# Patient Record
Sex: FEMALE | Race: BLACK OR AFRICAN AMERICAN | NOT HISPANIC OR LATINO | Employment: OTHER | URBAN - METROPOLITAN AREA
[De-identification: names, ages, dates, MRNs, and addresses within clinical notes are randomized per-mention and may not be internally consistent; named-entity substitution may affect disease eponyms.]

---

## 2018-06-01 ENCOUNTER — ANESTHESIA EVENT (OUTPATIENT)
Dept: SURGERY | Facility: OTHER | Age: 58
End: 2018-06-01
Payer: MEDICARE

## 2018-06-01 ENCOUNTER — HOSPITAL ENCOUNTER (OUTPATIENT)
Dept: PREADMISSION TESTING | Facility: OTHER | Age: 58
Discharge: HOME | End: 2018-06-01
Attending: ORTHOPAEDIC SURGERY
Payer: MEDICARE

## 2018-06-01 VITALS
BODY MASS INDEX: 31.86 KG/M2 | HEIGHT: 67 IN | OXYGEN SATURATION: 99 % | TEMPERATURE: 99 F | DIASTOLIC BLOOD PRESSURE: 55 MMHG | HEART RATE: 73 BPM | SYSTOLIC BLOOD PRESSURE: 112 MMHG | WEIGHT: 203 LBS

## 2018-06-01 RX ORDER — TRAZODONE HYDROCHLORIDE 100 MG/1
100 TABLET ORAL NIGHTLY
COMMUNITY

## 2018-06-01 RX ORDER — ASPIRIN 81 MG/1
81 TABLET ORAL 2 TIMES DAILY
COMMUNITY

## 2018-06-01 RX ORDER — RISPERIDONE 1 MG/1
1 TABLET ORAL DAILY
COMMUNITY

## 2018-06-01 RX ORDER — SODIUM CHLORIDE, SODIUM LACTATE, POTASSIUM CHLORIDE, CALCIUM CHLORIDE 600; 310; 30; 20 MG/100ML; MG/100ML; MG/100ML; MG/100ML
INJECTION, SOLUTION INTRAVENOUS CONTINUOUS
Status: CANCELLED | OUTPATIENT
Start: 2018-06-01

## 2018-06-01 RX ORDER — LOSARTAN POTASSIUM 100 MG/1
100 TABLET ORAL DAILY
COMMUNITY

## 2018-06-01 RX ORDER — METHOCARBAMOL 500 MG/1
500 TABLET, FILM COATED ORAL 4 TIMES DAILY
COMMUNITY

## 2018-06-01 RX ORDER — OXYCODONE AND ACETAMINOPHEN 5; 325 MG/1; MG/1
1 TABLET ORAL EVERY 4 HOURS PRN
Status: ON HOLD | COMMUNITY
End: 2018-06-04 | Stop reason: HOSPADM

## 2018-06-01 RX ORDER — PREGABALIN 75 MG/1
150 CAPSULE ORAL
Status: DISCONTINUED | OUTPATIENT
Start: 2018-06-01 | End: 2018-06-02 | Stop reason: HOSPADM

## 2018-06-01 RX ORDER — FAMOTIDINE 20 MG/1
20 TABLET, FILM COATED ORAL
Status: CANCELLED | OUTPATIENT
Start: 2018-06-01 | End: 2018-06-01

## 2018-06-01 RX ORDER — CLONIDINE HYDROCHLORIDE 0.1 MG/1
0.1 TABLET ORAL 2 TIMES DAILY
COMMUNITY

## 2018-06-01 NOTE — DISCHARGE INSTRUCTIONS
PRE-ADMIT TESTING -  [phone]    [address]  [facility]          Your surgery has been scheduled at [facility]. We are pleased to have the opportunity to serve you. For Further Information please call [phone].    On the day of surgery please report to the Information Desk on the 1st floor.    · CONTACT YOUR PHYSICIAN'S OFFICE THE DAY PRIOR TO YOUR SURGERY TO OBTAIN YOUR ARRIVAL TIME.     · The evening before surgery do not eat anything after 9 p.m. ( this includes hard candy, chewing gum and mints).  You may only have GATORADE, POWERADE AND WATER  from 9 p.m. until you leave your home.   DO NOT DRINK ANY LIQUIDS ON THE WAY TO THE HOSPITAL.      SPECIAL MEDICATION INSTRUCTIONS: TAKE medications checked off by the Anesthesiologist on your Medication List.    Angiogram Patients: Take medications as instructed by your physician, including aspirin.     Surgery Patients:    If you take ASPIRIN - Your PHYSICIAN/SURGEON will need to inform you IF/OR when you need to stop taking aspirin prior to your surgery.     Do Not take any medications containing IBUPROFEN.  Do Not Wear any make-up or dark nail polish   (especially eye make-up) to surgery. If you come to surgery with makeup on you will be required to remove the makeup or nail polish.    Do not shave your surgical area at least 5 days prior to your surgery. The surgical prep will be performed at the hospital according to Infection Control regulations.    Leave all valuables at home.   Do Not wear any jewelry or watches, including any metal in body piercings.  Contact Lens must be removed before surgery. Either do not wear the contact lens or bring a case and solution for storage.  Please bring a container for eyeglasses or dentures as required.  Bring any paperwork your physician has provided, such as consent forms,  history and physicals, doctor's orders, etc.   Bring comfortable clothes that are loose fitting to wear upon  discharge. Take into consideration the type of surgery being performed.  Maintain your diet as advised per your physician the day prior to surgery.      Adequate rest the night before surgery is advised.   Park in the Parking lot behind the hospital or in the [place] across the street from the parking lot. Parking is complimentary.  If you will be discharged the same day as your procedure, please arrange for a responsible adult to drive you home or to accompany you if traveling by taxi.   YOU WILL NOT BE PERMITTED TO DRIVE OR TO LEAVE THE HOSPITAL ALONE AFTER SURGERY.   It is strongly recommended that you arrange for someone to remain with you for the first 24 hrs following your surgery.       Thank you for your cooperation.  The Staff of [facility].        Bathing Instructions                                                                 Please shower the evening before and morning of your procedure with    ANTIBACTERIAL SOAP. ( DIAL, etc )  Concentrate on the surgical area   for at least 3 minutes and rinse completely. Dry off as usual.   Do not use any deodorant, powder, body lotions, perfume, after shave or    cologne.

## 2018-06-01 NOTE — ANESTHESIA PREPROCEDURE EVALUATION
06/01/2018  Dominga Rosario is a 58 y.o., female.    Anesthesia Evaluation    I have reviewed the Patient Summary Reports.    I have reviewed the Nursing Notes.   I have reviewed the Medications.     Review of Systems  Anesthesia Hx:  No previous Anesthesia  Denies Family Hx of Anesthesia complications.    Social:  Non-Smoker    Hematology/Oncology:  Hematology Normal   Oncology Normal     Cardiovascular:   Hypertension, well controlled    Pulmonary:  Pulmonary Normal    Renal/:  Renal/ Normal     Hepatic/GI:  Hepatic/GI Normal    Musculoskeletal:  Musculoskeletal Normal    Neurological:  Neurology Normal    Endocrine:  Endocrine Normal        Physical Exam  General:  Well nourished    Airway/Jaw/Neck:  Airway Findings: Mouth Opening: Normal Tongue: Large  General Airway Assessment: Adult  Mallampati: II  TM Distance: Normal, at least 6 cm         Dental:  Dental Findings: In tact             Anesthesia Plan  Type of Anesthesia, risks & benefits discussed:  Anesthesia Type:  general  Patient's Preference:   Intra-op Monitoring Plan: standard ASA monitors  Intra-op Monitoring Plan Comments:   Post Op Pain Control Plan:   Post Op Pain Control Plan Comments:   Induction:   IV  Beta Blocker:         Informed Consent: Patient understands risks and agrees with Anesthesia plan.  Questions answered. Anesthesia consent signed with patient.  ASA Score: 2     Day of Surgery Review of History & Physical:    H&P update referred to the surgeon.         Ready For Surgery From Anesthesia Perspective.

## 2018-06-01 NOTE — PRE ADMISSION SCREENING
Patient and daughter report labs were done at [facility] ED last week.  Attempted to obtain with fax request.  Per [facility] return fax no labs done.  Dr. Garcia informed.  No new orders.

## 2018-06-04 ENCOUNTER — HOSPITAL ENCOUNTER (OUTPATIENT)
Facility: OTHER | Age: 58
Discharge: HOME | End: 2018-06-04
Attending: ORTHOPAEDIC SURGERY | Admitting: ORTHOPAEDIC SURGERY
Payer: MEDICARE

## 2018-06-04 ENCOUNTER — ANESTHESIA (OUTPATIENT)
Dept: SURGERY | Facility: OTHER | Age: 58
End: 2018-06-04
Payer: MEDICARE

## 2018-06-04 VITALS
RESPIRATION RATE: 16 BRPM | SYSTOLIC BLOOD PRESSURE: 130 MMHG | DIASTOLIC BLOOD PRESSURE: 68 MMHG | HEART RATE: 82 BPM | BODY MASS INDEX: 31.86 KG/M2 | WEIGHT: 203 LBS | OXYGEN SATURATION: 97 % | TEMPERATURE: 98 F | HEIGHT: 67 IN

## 2018-06-04 DIAGNOSIS — S82.841A CLOSED BIMALLEOLAR FRACTURE OF RIGHT ANKLE, INITIAL ENCOUNTER: Primary | ICD-10-CM

## 2018-06-04 DIAGNOSIS — S82.841A FRACTURE OF ANKLE, BIMALLEOLAR, RIGHT, CLOSED, INITIAL ENCOUNTER: ICD-10-CM

## 2018-06-04 PROCEDURE — 63600175 PHARM REV CODE 636 W HCPCS: Performed by: ORTHOPAEDIC SURGERY

## 2018-06-04 PROCEDURE — C1769 GUIDE WIRE: HCPCS | Performed by: ORTHOPAEDIC SURGERY

## 2018-06-04 PROCEDURE — 37000008 HC ANESTHESIA 1ST 15 MINUTES: Performed by: ORTHOPAEDIC SURGERY

## 2018-06-04 PROCEDURE — 25000003 PHARM REV CODE 250: Performed by: ANESTHESIOLOGY

## 2018-06-04 PROCEDURE — 27201423 OPTIME MED/SURG SUP & DEVICES STERILE SUPPLY: Performed by: ORTHOPAEDIC SURGERY

## 2018-06-04 PROCEDURE — 25000003 PHARM REV CODE 250: Performed by: NURSE ANESTHETIST, CERTIFIED REGISTERED

## 2018-06-04 PROCEDURE — 36000708 HC OR TIME LEV III 1ST 15 MIN: Performed by: ORTHOPAEDIC SURGERY

## 2018-06-04 PROCEDURE — C1713 ANCHOR/SCREW BN/BN,TIS/BN: HCPCS | Performed by: ORTHOPAEDIC SURGERY

## 2018-06-04 PROCEDURE — 71000016 HC POSTOP RECOV ADDL HR: Performed by: ORTHOPAEDIC SURGERY

## 2018-06-04 PROCEDURE — C9290 INJ, BUPIVACAINE LIPOSOME: HCPCS | Performed by: ORTHOPAEDIC SURGERY

## 2018-06-04 PROCEDURE — 71000015 HC POSTOP RECOV 1ST HR: Performed by: ORTHOPAEDIC SURGERY

## 2018-06-04 PROCEDURE — 71000033 HC RECOVERY, INTIAL HOUR: Performed by: ORTHOPAEDIC SURGERY

## 2018-06-04 PROCEDURE — 36000709 HC OR TIME LEV III EA ADD 15 MIN: Performed by: ORTHOPAEDIC SURGERY

## 2018-06-04 PROCEDURE — 25000003 PHARM REV CODE 250: Performed by: ORTHOPAEDIC SURGERY

## 2018-06-04 PROCEDURE — 97163 PT EVAL HIGH COMPLEX 45 MIN: CPT

## 2018-06-04 PROCEDURE — 37000009 HC ANESTHESIA EA ADD 15 MINS: Performed by: ORTHOPAEDIC SURGERY

## 2018-06-04 PROCEDURE — 63600175 PHARM REV CODE 636 W HCPCS: Performed by: NURSE ANESTHETIST, CERTIFIED REGISTERED

## 2018-06-04 DEVICE — SCREW BONE NON LOCK 3.5X14MM: Type: IMPLANTABLE DEVICE | Site: ANKLE | Status: FUNCTIONAL

## 2018-06-04 DEVICE — SCREW BONE LOCK T10 3.5X12MM: Type: IMPLANTABLE DEVICE | Site: ANKLE | Status: FUNCTIONAL

## 2018-06-04 DEVICE — SCREW BONE NON LOCK 3.5X12MM: Type: IMPLANTABLE DEVICE | Site: ANKLE | Status: FUNCTIONAL

## 2018-06-04 DEVICE — SCREW BONE LOCK T10 3.5X14MM: Type: IMPLANTABLE DEVICE | Site: ANKLE | Status: FUNCTIONAL

## 2018-06-04 DEVICE — SCREW BONE NON LOCK 3.5X16MM: Type: IMPLANTABLE DEVICE | Site: ANKLE | Status: FUNCTIONAL

## 2018-06-04 DEVICE — IMPLANTABLE DEVICE: Type: IMPLANTABLE DEVICE | Site: ANKLE | Status: FUNCTIONAL

## 2018-06-04 DEVICE — PLATE BONE FIB VARIAX LAT DIST: Type: IMPLANTABLE DEVICE | Site: ANKLE | Status: FUNCTIONAL

## 2018-06-04 DEVICE — SCREW BONE LOCK T10 3.5X16MM: Type: IMPLANTABLE DEVICE | Site: ANKLE | Status: FUNCTIONAL

## 2018-06-04 DEVICE — SCREW BONE ASNIS III 4X40MM: Type: IMPLANTABLE DEVICE | Site: ANKLE | Status: FUNCTIONAL

## 2018-06-04 RX ORDER — SODIUM CHLORIDE, SODIUM LACTATE, POTASSIUM CHLORIDE, CALCIUM CHLORIDE 600; 310; 30; 20 MG/100ML; MG/100ML; MG/100ML; MG/100ML
INJECTION, SOLUTION INTRAVENOUS CONTINUOUS
Status: DISCONTINUED | OUTPATIENT
Start: 2018-06-04 | End: 2018-06-04 | Stop reason: HOSPADM

## 2018-06-04 RX ORDER — MEPERIDINE HYDROCHLORIDE 50 MG/ML
12.5 INJECTION INTRAMUSCULAR; INTRAVENOUS; SUBCUTANEOUS ONCE AS NEEDED
Status: DISCONTINUED | OUTPATIENT
Start: 2018-06-04 | End: 2018-06-04 | Stop reason: HOSPADM

## 2018-06-04 RX ORDER — ACETAMINOPHEN 10 MG/ML
INJECTION, SOLUTION INTRAVENOUS
Status: DISCONTINUED | OUTPATIENT
Start: 2018-06-04 | End: 2018-06-04

## 2018-06-04 RX ORDER — GENTAMICIN SULFATE 80 MG/100ML
80 INJECTION, SOLUTION INTRAVENOUS
Status: DISCONTINUED | OUTPATIENT
Start: 2018-06-04 | End: 2018-06-04 | Stop reason: HOSPADM

## 2018-06-04 RX ORDER — KETOROLAC TROMETHAMINE 30 MG/ML
INJECTION, SOLUTION INTRAMUSCULAR; INTRAVENOUS
Status: DISCONTINUED | OUTPATIENT
Start: 2018-06-04 | End: 2018-06-04

## 2018-06-04 RX ORDER — HYDROMORPHONE HYDROCHLORIDE 2 MG/ML
INJECTION, SOLUTION INTRAMUSCULAR; INTRAVENOUS; SUBCUTANEOUS
Status: DISCONTINUED | OUTPATIENT
Start: 2018-06-04 | End: 2018-06-04

## 2018-06-04 RX ORDER — PROPOFOL 10 MG/ML
VIAL (ML) INTRAVENOUS
Status: DISCONTINUED | OUTPATIENT
Start: 2018-06-04 | End: 2018-06-04

## 2018-06-04 RX ORDER — OXYCODONE HYDROCHLORIDE 5 MG/1
5 TABLET ORAL
Status: DISCONTINUED | OUTPATIENT
Start: 2018-06-04 | End: 2018-06-04 | Stop reason: HOSPADM

## 2018-06-04 RX ORDER — LIDOCAINE HCL/PF 100 MG/5ML
SYRINGE (ML) INTRAVENOUS
Status: DISCONTINUED | OUTPATIENT
Start: 2018-06-04 | End: 2018-06-04

## 2018-06-04 RX ORDER — FENTANYL CITRATE 50 UG/ML
25 INJECTION, SOLUTION INTRAMUSCULAR; INTRAVENOUS EVERY 5 MIN PRN
Status: DISCONTINUED | OUTPATIENT
Start: 2018-06-04 | End: 2018-06-04 | Stop reason: HOSPADM

## 2018-06-04 RX ORDER — FENTANYL CITRATE 50 UG/ML
INJECTION, SOLUTION INTRAMUSCULAR; INTRAVENOUS
Status: DISCONTINUED | OUTPATIENT
Start: 2018-06-04 | End: 2018-06-04

## 2018-06-04 RX ORDER — OXYCODONE HYDROCHLORIDE 5 MG/1
CAPSULE ORAL
Qty: 40 CAPSULE | Refills: 0 | Status: SHIPPED | OUTPATIENT
Start: 2018-06-04

## 2018-06-04 RX ORDER — ROCURONIUM BROMIDE 10 MG/ML
INJECTION, SOLUTION INTRAVENOUS
Status: DISCONTINUED | OUTPATIENT
Start: 2018-06-04 | End: 2018-06-04

## 2018-06-04 RX ORDER — GLYCOPYRROLATE 0.2 MG/ML
INJECTION INTRAMUSCULAR; INTRAVENOUS
Status: DISCONTINUED | OUTPATIENT
Start: 2018-06-04 | End: 2018-06-04

## 2018-06-04 RX ORDER — FAMOTIDINE 20 MG/1
20 TABLET, FILM COATED ORAL
Status: COMPLETED | OUTPATIENT
Start: 2018-06-04 | End: 2018-06-04

## 2018-06-04 RX ORDER — SODIUM CHLORIDE 0.9 % (FLUSH) 0.9 %
3 SYRINGE (ML) INJECTION
Status: DISCONTINUED | OUTPATIENT
Start: 2018-06-04 | End: 2018-06-04 | Stop reason: HOSPADM

## 2018-06-04 RX ORDER — DEXTROMETHORPHAN HYDROBROMIDE, GUAIFENESIN 5; 100 MG/5ML; MG/5ML
650 LIQUID ORAL EVERY 8 HOURS
Refills: 0 | COMMUNITY
Start: 2018-06-04

## 2018-06-04 RX ORDER — HYDROCODONE BITARTRATE AND ACETAMINOPHEN 5; 325 MG/1; MG/1
1 TABLET ORAL EVERY 4 HOURS PRN
Status: DISCONTINUED | OUTPATIENT
Start: 2018-06-04 | End: 2018-06-04 | Stop reason: HOSPADM

## 2018-06-04 RX ORDER — NEOSTIGMINE METHYLSULFATE 1 MG/ML
INJECTION, SOLUTION INTRAVENOUS
Status: DISCONTINUED | OUTPATIENT
Start: 2018-06-04 | End: 2018-06-04

## 2018-06-04 RX ORDER — CEFAZOLIN SODIUM 1 G/3ML
2 INJECTION, POWDER, FOR SOLUTION INTRAMUSCULAR; INTRAVENOUS
Status: DISCONTINUED | OUTPATIENT
Start: 2018-06-04 | End: 2018-06-04 | Stop reason: HOSPADM

## 2018-06-04 RX ORDER — ONDANSETRON 2 MG/ML
4 INJECTION INTRAMUSCULAR; INTRAVENOUS DAILY PRN
Status: DISCONTINUED | OUTPATIENT
Start: 2018-06-04 | End: 2018-06-04 | Stop reason: HOSPADM

## 2018-06-04 RX ORDER — MIDAZOLAM HYDROCHLORIDE 1 MG/ML
INJECTION INTRAMUSCULAR; INTRAVENOUS
Status: DISCONTINUED | OUTPATIENT
Start: 2018-06-04 | End: 2018-06-04

## 2018-06-04 RX ORDER — CEPHALEXIN 500 MG/1
500 CAPSULE ORAL EVERY 6 HOURS
Qty: 20 CAPSULE | Refills: 0 | Status: SHIPPED | OUTPATIENT
Start: 2018-06-04

## 2018-06-04 RX ADMIN — PROPOFOL 200 MG: 10 INJECTION, EMULSION INTRAVENOUS at 10:06

## 2018-06-04 RX ADMIN — HYDROMORPHONE HYDROCHLORIDE 1 MG: 2 INJECTION INTRAMUSCULAR; INTRAVENOUS; SUBCUTANEOUS at 11:06

## 2018-06-04 RX ADMIN — FAMOTIDINE 20 MG: 20 TABLET ORAL at 08:06

## 2018-06-04 RX ADMIN — GLYCOPYRROLATE 0.2 MG: 0.2 INJECTION, SOLUTION INTRAMUSCULAR; INTRAVENOUS at 10:06

## 2018-06-04 RX ADMIN — ACETAMINOPHEN 1000 MG: 10 INJECTION, SOLUTION INTRAVENOUS at 11:06

## 2018-06-04 RX ADMIN — FENTANYL CITRATE 50 MCG: 50 INJECTION, SOLUTION INTRAMUSCULAR; INTRAVENOUS at 11:06

## 2018-06-04 RX ADMIN — FENTANYL CITRATE 50 MCG: 50 INJECTION, SOLUTION INTRAMUSCULAR; INTRAVENOUS at 10:06

## 2018-06-04 RX ADMIN — MIDAZOLAM HYDROCHLORIDE 2 MG: 1 INJECTION, SOLUTION INTRAMUSCULAR; INTRAVENOUS at 10:06

## 2018-06-04 RX ADMIN — NEOSTIGMINE METHYLSULFATE 5 MG: 1 INJECTION INTRAVENOUS at 11:06

## 2018-06-04 RX ADMIN — ROCURONIUM BROMIDE 30 MG: 10 INJECTION, SOLUTION INTRAVENOUS at 10:06

## 2018-06-04 RX ADMIN — CEFAZOLIN 2 G: 330 INJECTION, POWDER, FOR SOLUTION INTRAMUSCULAR; INTRAVENOUS at 10:06

## 2018-06-04 RX ADMIN — SODIUM CHLORIDE, SODIUM LACTATE, POTASSIUM CHLORIDE, AND CALCIUM CHLORIDE: 600; 310; 30; 20 INJECTION, SOLUTION INTRAVENOUS at 10:06

## 2018-06-04 RX ADMIN — CARBOXYMETHYLCELLULOSE SODIUM 2 DROP: 2.5 SOLUTION/ DROPS OPHTHALMIC at 10:06

## 2018-06-04 RX ADMIN — SODIUM CHLORIDE, SODIUM LACTATE, POTASSIUM CHLORIDE, AND CALCIUM CHLORIDE: 600; 310; 30; 20 INJECTION, SOLUTION INTRAVENOUS at 11:06

## 2018-06-04 RX ADMIN — GLYCOPYRROLATE 0.8 MG: 0.2 INJECTION, SOLUTION INTRAMUSCULAR; INTRAVENOUS at 11:06

## 2018-06-04 RX ADMIN — FENTANYL CITRATE 100 MCG: 50 INJECTION, SOLUTION INTRAMUSCULAR; INTRAVENOUS at 10:06

## 2018-06-04 RX ADMIN — KETOROLAC TROMETHAMINE 30 MG: 30 INJECTION, SOLUTION INTRAMUSCULAR; INTRAVENOUS at 11:06

## 2018-06-04 RX ADMIN — LIDOCAINE HYDROCHLORIDE 75 MG: 20 INJECTION, SOLUTION INTRAVENOUS at 10:06

## 2018-06-04 NOTE — INTERVAL H&P NOTE
The patient has been examined and the H&P has been reviewed:    I concur with the findings and no changes have occurred since H&P was written.    Anesthesia/Surgery risks, benefits and alternative options discussed and understood by patient/family.          Active Hospital Problems    Diagnosis  POA    Fracture of ankle, bimalleolar, right, closed, initial encounter [S88.638L]  Yes      Resolved Hospital Problems    Diagnosis Date Resolved POA   No resolved problems to display.

## 2018-06-04 NOTE — OR NURSING
"Patient arrived to outpatient surgery in wheelchair with her mother. Patient states she needs to be lifted from the wheelchair to the bed. When asked about crutches she states she guess she just hasn't adjusted to them. Patient offered a walker and again she says someone needs to lift me to the bed.Patient appears to answer questions appropriately regarding medical history and medications, but mother states the answers are incorrect. Mother states the patient is bipolar and schizophrenic and that she has crawled around the floor and slept on the floor since the accident last Wednesday. Mother states she has refused to use the crutches so she has crawled to the door and I have pushed her into the house from the back. Mother states she expected her to stay in a facility about 20 days, she has no help with her and "she is not breaking me down". Mother states that it was never explained to her that this was supposed to be an outpatient surgery. Dr. Cisse contacted with above concerns, order received to consult . PT also consulted to evaluate patients ability to learn how to use crutches.   "

## 2018-06-04 NOTE — DISCHARGE INSTRUCTIONS

## 2018-06-04 NOTE — PLAN OF CARE
06/04/18 1317   Discharge Assessment   Assessment Type Discharge Planning Assessment   Confirmed/corrected address and phone number on facesheet? Yes   Assessment information obtained from? Patient;Caregiver;Medical Record   Communicated expected length of stay with patient/caregiver yes   Prior to hospitilization cognitive status: Alert/Oriented   Prior to hospitalization functional status: Independent   Current cognitive status: Alert/Oriented   Current Functional Status: Assistive Equipment;Needs Assistance   Lives With parent(s)   Able to Return to Prior Arrangements yes   Is patient able to care for self after discharge? Yes   Patient currently receives any other outside agency services? No   Equipment Currently Used at Home none   Do you have any problems affording any of your prescribed medications? No   Is the patient taking medications as prescribed? yes   Does the patient have transportation home? Yes   Discharge Plan A Home Health   Patient/Family In Agreement With Plan yes

## 2018-06-04 NOTE — ANESTHESIA POSTPROCEDURE EVALUATION
"Anesthesia Post Evaluation    Patient: Dominga Rosario    Procedure(s) Performed: Procedure(s) (LRB):  ORIF, ANKLE (Right)    Final Anesthesia Type: general  Patient location during evaluation: PACU  Patient participation: Yes- Able to Participate  Level of consciousness: awake and alert  Post-procedure vital signs: reviewed and stable  Pain management: adequate  Airway patency: patent  PONV status at discharge: No PONV  Anesthetic complications: no      Cardiovascular status: blood pressure returned to baseline  Respiratory status: unassisted and room air  Hydration status: euvolemic  Follow-up not needed.        Visit Vitals  BP (!) 154/75 (BP Location: Left arm, Patient Position: Lying)   Pulse 80   Temp 36.7 °C (98 °F) (Oral)   Resp 16   Ht 5' 7" (1.702 m)   Wt 92.1 kg (203 lb)   SpO2 100%   Breastfeeding? No   BMI 31.79 kg/m²       Pain/Hector Score: Pain Assessment Performed: Yes (6/4/2018  1:09 PM)  Presence of Pain: complains of pain/discomfort (6/4/2018  1:09 PM)  Hector Score: 10 (6/4/2018  1:09 PM)      "

## 2018-06-04 NOTE — PT/OT/SLP EVAL
"Physical Therapy Evaluation/Discharge  Will need new PT orders for re evaluation after surgery 6/4/2018     Patient Name:  Dominga Rosario   MRN:  [id number]    Recommendations:     Discharge Recommendations:   (Pending post op evaluation)   Discharge Equipment Recommendations:  (pending post op evaluation)   Barriers to discharge: Pt has a history of Bipolar and Schizophrenia. Mother is caregiver. Pt reports being currently on psych meds. Per RN documentation "Mother states she expected her to stay in a facility about 20 days, she has no help with her and "she is not breaking me down"."    Assessment:     Dominga Rosario is a 58 y.o. female admitted with a medical diagnosis of <principal problem not specified>.  She presents with the following impairments/functional limitations:  weakness, gait instability, impaired balance, decreased lower extremity function, decreased ROM, orthopedic precautions, impaired self care skills, pain, decreased safety awareness, impaired functional mobilty, impaired cognition. PT evaluation completed. Pt fell 5/30/18 resulting in a R ankle fracture required operative treatment with Pre-op Diagnosis: Ankle fracture, bimalleolar, closed, right, initial encounter [S82.841A] s/p Procedure(s):  ORIF, ANKLE   6/4/2018.   Pt denies any other history of falls. She is able to follow one step simple commands pre operatively and is appropriate to situation. PT called to evaluate patient pre operatively by RN secondary to concerns about ability to transfer from reports patient has been crawling on the floor and sleeping on the floor since her fall.     Rehab Prognosis:  Good; patient would benefit from acute skilled PT services to address these deficits and reach maximum level of function.      Recent Surgery: Procedure(s) (LRB):  ORIF, ANKLE (Right) Day of Surgery    Plan:     During this hospitalization, patient to be seen daily to address the above listed problems via gait training, " "therapeutic activities, therapeutic exercises, neuromuscular re-education, wheelchair management/training  · Plan of Care Expires:  07/04/18   Plan of Care Reviewed with: patient, mother    Subjective     Communicated with RN prior to session.  Patient found Supine upon PT entry to room, agreeable to evaluation.      Chief Complaint: "The problem is my left leg gives out" no signs of decreased strength on this side   Patient comments/goals: To return to PLOF of independence   Pain/Comfort:  · Pain Rating 1: 3/10  · Location - Side 1: Right  · Location - Orientation 1: generalized  · Location 1: ankle  · Pain Addressed 1: Reposition, Distraction  · Pain Rating Post-Intervention 1: 3/10    Patients cultural, spiritual, Taoism conflicts given the current situation: none specified    Living Environment:  Pt lives with her mother in a 1 story house with a threshold CATIE.  Prior to admission, patients level of function was (I) with all mobility and ADLs. She does not work or drive. She enjoys watching TV and listening to the radio.  Patient has the following equipment: none.  DME owned (not currently used): none.  Upon discharge, patient will have assistance from unknown level of assistance mother can give post operatively.    Objective:     Patient found with:       General Precautions: Standard, fall   Orthopedic Precautions: (Treated conservatively as RLE NWB)   Braces:  (Pt with cast to R ankle from ER visit 5/30/18)     Exams:  · Cognition: Patient is oriented to Person, Place, Time and Situation and follows approximately 100% of one step simple commands.    · Posture:    · -       No postural abnormalities identified  · Sensation: Intact to light touch bilateral LEs.  · Skin Integrity: plantar aspect of L foot has circular healed wounds (appearing like a healed diabetic foot ulcer)  · Edema: None noted   · Coordination: No coordination impairments identified with functional mobility. No formal testing " "performed.   · LE ROM/Strength: LLE: WNL 5/5. RLE: ankle NT, knee extension: 5, hip flexion: 4     Functional Mobility:  · Bed Mobility:     · Supine to Sit: stand by assistance with HOB elevated, verbal cues given for safety   · Sit to Supine: stand by assistance with HOB elevated, verbal cues given for safety   · Transfers:     · Sit to Stand:  minimum assistance with standard walker x 2 from EOB. Pt required visual demonstration, verbal cues for hand placement, NWB status of RLE and technique. Pt performed with min A at RLE to maintain NWB status.   · Balance: Normal static sitting balance at EOB, Fair static standing balance requiring CGA for safety     AM-PAC 6 CLICK MOBILITY  Total Score:13     Therapeutic Activities and Exercises:   All questions and concerns answered for patient and mom about rehab and PT role and POC.     Patient left supine with all lines intact, call button in reach, RN notified and mom present.    GOALS:    Physical Therapy Goals        Problem: Physical Therapy Goal    Goal Priority Disciplines Outcome Goal Variances Interventions   Physical Therapy Goal     PT/OT, PT Ongoing (interventions implemented as appropriate)     Description:  Goals to be met by: 18     Patient will increase functional independence with mobility by performin. Supine to sit with supervision.   2. Sit to supine with supervision.   3. Sit<>stand transfer with supervision using rolling walker.   4. Gait > 50 feet with SBA using rolling walker.   5. Ascend/descend 6" curb step with RW and SBA                   History:     Past Medical History:   Diagnosis Date    Bipolar disorder     with schizophrenia (per mother's report); followed by Psychiatry    Fall 2018    Hypertension        History reviewed. No pertinent surgical history.    Clinical Decision Making:     History  Co-morbidities and personal factors that may impact the plan of care Examination  Body Structures and Functions, activity " limitations and participation restrictions that may impact the plan of care Clinical Presentation   Decision Making/ Complexity Score   Co-morbidities:   [] Time since onset of injury / illness / exacerbation  [] Status of current condition  []Patient's cognitive status and safety concerns    [] Multiple Medical Problems (see med hx)  Personal Factors:   [] Patient's age  [] Prior Level of function   [] Patient's home situation (environment and family support)  [] Patient's level of motivation  [] Expected progression of patient      HISTORY:(criteria)    [] 87600 - no personal factors/history    [] 78946 - has 1-2 personal factor/comorbidity     [] 61295 - has >3 personal factor/comorbidity     Body Regions:  [] Objective examination findings  [] Head     []  Neck  [] Trunk   [] Upper Extremity  [] Lower Extremity    Body Systems:  [] For communication ability, affect, cognition, language, and learning style: the assessment of the ability to make needs known, consciousness, orientation (person, place, and time), expected emotional /behavioral responses, and learning preferences (eg, learning barriers, education  needs)  [] For the neuromuscular system: a general assessment of gross coordinated movement (eg, balance, gait, locomotion, transfers, and transitions) and motor function  (motor control and motor learning)  [] For the musculoskeletal system: the assessment of gross symmetry, gross range of motion, gross strength, height, and weight  [] For the integumentary system: the assessment of pliability(texture), presence of scar formation, skin color, and skin integrity  [] For cardiovascular/pulmonary system: the assessment of heart rate, respiratory rate, blood pressure, and edema     Activity limitations:    [] Patient's cognitive status and saf ety concerns          [] Status of current condition      [] Weight bearing restriction  [] Cardiopulmunary Restriction    Participation Restrictions:   [] Goals and  goal agreement with the patient     [] Rehab potential (prognosis) and probable outcome      Examination of Body System: (criteria)    [] 21018 - addressing 1-2 elements    [] 90191 - addressing a total of 3 or more elements     [] 59760 -  Addressing a total of 4 or more elements         Clinical Presentation: (criteria)  Choose one     On examination of body system using standardized tests and measures patient presents with (CHOOSE ONE) elements from any of the following: body structures and functions, activity limitations, and/or participation restrictions.  Leading to a clinical presentation that is considered (CHOOSE ONE)                              Clinical Decision Making  (Eval Complexity):  Choose One     Time Tracking:     PT Received On: 06/04/18  PT Start Time: 0834     PT Stop Time: 0909  PT Total Time (min): 35 min     Billable Minutes: Evaluation 35    Jennifer Badillo, PT, DPT  06/04/2018

## 2018-06-04 NOTE — TRANSFER OF CARE
"Anesthesia Transfer of Care Note    Patient: Dominga Rosario    Procedure(s) Performed: Procedure(s) (LRB):  ORIF, ANKLE (Right)    Patient location: PACU    Anesthesia Type: general    Transport from OR: Transported from OR on 2-3 L/min O2 by NC with adequate spontaneous ventilation    Post pain: adequate analgesia    Post assessment: no apparent anesthetic complications    Post vital signs: stable    Level of consciousness: awake, alert and oriented    Nausea/Vomiting: no nausea/vomiting    Complications: none    Transfer of care protocol was followed      Last vitals:   Visit Vitals  BP (!) 198/86 (BP Location: Left arm, Patient Position: Lying)   Pulse 83   Temp 36.5 °C (97.7 °F) (Oral)   Resp 16   Ht 5' 7" (1.702 m)   Wt 92.1 kg (203 lb)   SpO2 100%   Breastfeeding? No   BMI 31.79 kg/m²     "

## 2018-06-04 NOTE — OR NURSING
Pt wihtout change from previous assessment. No c/o pain at this time. Prepared for transfer to acu.

## 2018-06-04 NOTE — PLAN OF CARE
"Problem: Physical Therapy Goal  Goal: Physical Therapy Goal  Goals to be met by: 18     Patient will increase functional independence with mobility by performin. Supine to sit with supervision.   2. Sit to supine with supervision.   3. Sit<>stand transfer with supervision using rolling walker.   4. Gait > 150 feet with SBA using rolling walker.   5. Ascend/descend 6" curb step with RW and SBA   Outcome: Ongoing (interventions implemented as appropriate)  PT evaluation completed. Please see progress note for details, POC, and recommendations.       "

## 2018-06-05 NOTE — OP NOTE
DATE OF PROCEDURE:  06/04/2018    PREOPERATIVE DIAGNOSIS:  Bimalleolar fracture of the right ankle with bipolar    disorder.    POSTOPERATIVE DIAGNOSIS:  Bimalleolar fracture of the right ankle with bipolar disorder.    PROCEDURE:  Open reduction internal fixation using Pyote instrumentation.    The patient was taken to the Operating Room, put to sleep under general   anesthesia.  The right lower extremity was prepped and draped.  Tourniquet   applied.  Tourniquet was elevated to 400 mmHg after Esmarch exsanguination, a   sandbag was placed in the right hip to roll her about 30 degrees to the left, a   midlateral incision was made medially, incised skin and subcutaneous tissues.    The fracture site was identified and taken down, clot and callus was removed and   the fracture could be anatomically reduced.  Attention was then turned to the   lateral aspect, straight incision made over the fibula, incised skin and   subcutaneous tissues.  The clot and callus was removed and this was held with a   bone clamp.  Following this, 2 interfragmentary screws were inserted from   anterior posteriorly and a Pyote fibular plate was then applied with 6 screw   holes laterally and 4 screw holes distally.  This was held in position and the   screw proximally and a screw distally was inserted.  X-ray showed anatomic   alignment of the fracture, fibula and medial malleolus was then held in position   and 2 Colette wires were drilled into the medial malleolus and a 2 cannulated   screws, each measuring about 45 mm and 55 mm in length were inserted.  The   Colette wires were then removed and x-rays verified, good alignment, AP and   lateral planes.  The remaining screws were then placed on the fibula.  The   fibular plate, which was applied had been bent distally to reduce its prominence   and screws were inserted measuring 8 to 10 mm distally and 12 to 14 mm   proximally.  One Syndesmotic screw was then inserted, which  measured 55 mm in   length after dorsiflexing the ankle to prevent over compression.  Screws were   all tightened.  X-ray showed anatomic position and alignment and the tourniquet   was released.  Tourniquet time was 1 hour.  Wound was irrigated in antibiotic   solution.  Laterally, the skin was closed with 3-0 Vicryl, 4-0 chromic and 4   staples to reinforce repair and medially the same process using 3-0 Vicryl, 4-0   chromic and about 4 staples as well.  The patient was then placed in a long leg   splint and returned to Recovery Room.  She was discharged on Keflex, oxycodone   and Tylenol, to return to the office in two weeks' time.  She is discharged to return   to the office in 2 weeks' time.  If there are any problems at all, they are to call   the office, we will have Social Service help with postoperative care.  She is to   leave the splint on and not take it off, not to get the bandage wet or dirty.      DAIANA/KARMEN  dd: 06/04/2018 12:06:40 (CDT)  td: 06/05/2018 03:12:56 (CDT)  Doc ID   #[id number]  Job ID #[id number]    CC:

## 2018-06-05 NOTE — PLAN OF CARE
"DME-  Delivered 6/4/18 N [id number]   One - 18" Wheelchair   Susu Castillo, [facility]  [place] Management  [phone]      "

## 2018-06-05 NOTE — PLAN OF CARE
Dominga Rosario has met all discharge criteria from Phase II. Vital Signs are stable, ambulating  without difficulty. Discharge instructions given, patient verbalized understanding. Discharged from facility via wheelchair in stable condition.

## 2018-06-19 NOTE — SUBJECTIVE & OBJECTIVE
"Principal Problem:Fracture of ankle, bimalleolar, right, closed, initial encounter    Principal Orthopedic Problem: ***    Interval History: ***    Review of patient's allergies indicates:  No Known Allergies    No current facility-administered medications for this encounter.      Current Outpatient Prescriptions   Medication Sig    aspirin (ECOTRIN) 81 MG EC tablet Take 81 mg by mouth 2 (two) times daily.    risperiDONE (RISPERDAL) 1 MG tablet Take 1 mg by mouth once daily.    traZODone (DESYREL) 100 MG tablet Take 100 mg by mouth every evening.    acetaminophen (TYLENOL) 650 MG TbSR Take 1 tablet (650 mg total) by mouth every 8 (eight) hours.    cephALEXin (KEFLEX) 500 MG capsule Take 1 capsule (500 mg total) by mouth every 6 (six) hours.    cloNIDine (CATAPRES) 0.1 MG tablet Take 0.1 mg by mouth 2 (two) times daily.    losartan (COZAAR) 100 MG tablet Take 100 mg by mouth once daily.    methocarbamol (ROBAXIN) 500 MG Tab Take 500 mg by mouth 4 (four) times daily.    oxyCODONE (OXY-IR) 5 mg Cap I to ii po q4h prn pain     Objective:     Vital Signs (Most Recent):  Temp: 98 °F (36.7 °C) (06/04/18 1309)  Pulse: 82 (06/04/18 1500)  Resp: 16 (06/04/18 1500)  BP: 130/68 (06/04/18 1500)  SpO2: 97 % (06/04/18 1500) Vital Signs (24h Range):        Weight: 92.1 kg (203 lb)  Height: 5' 7" (170.2 cm)  Body mass index is 31.79 kg/m².    No intake or output data in the 24 hours ending 06/19/18 1700    Ortho/SPM Exam    Significant Labs: {Results:64729::"All pertinent labs within the past 24 hours have been reviewed."}    Significant Imaging: {Imaging Review:80444}  "

## 2018-06-19 NOTE — OP NOTE
DATE OF PROCEDURE:  06/04/2018    PREOPERATIVE DIAGNOSIS:  Bimalleolar fracture of the right ankle.    POSTOPERATIVE DIAGNOSIS:  Bimalleolar fracture of the right ankle.    OPERATIVE PROCEDURE:  Open reduction and internal fixation using a Rashid   system.    PROCEDURE IN DETAIL:  The patient was taken to the Operating Room and given a   general anesthetic.  The right lower extremity was then prepped and draped.    Following this, the tourniquet was elevated at 400 mmHg after elevation for   exsanguination, a straight incision made medially and then medial malleolus was   identified and held with a towel clip.  Attention was turned laterally and the   fractured fibula was identified and this was held in place with alligator type   clamp and approximately 7-hole lateral malleolar [place] screw plate was   applied.  This was held in position with clamps and x-rays showed good   alignment.  Following this, sequentially screws were placed in the distal   portion of fibula and then on the shaft.  Care was taken to one oblique screw   was placed across the fracture site in an interfragmentary fashion.  Anatomical   alignment was obtained.  Medially, then a straight incision was made and 2   Colette wires were drilled into the malleolus into the proximal tibia and two   50 mm cancellous screws were inserted.  X-ray showed good alignment.  Following   this, the tourniquet was released.  Tourniquet time was 1 hour.  The wounds were   closed with 3-0 Vicryl and the skin was closed with staples and the patient was   placed in a short-leg splint postoperatively and discharged on Norco and   Keflex.  Return to the office in 2 weeks' time.      DCF/IN  dd: 06/19/2018 17:09:13 (CDT)  td: 06/19/2018 17:38:37 (CDT)  Doc ID   #[id number]  Job ID #[id number]    CC:

## 2018-06-20 NOTE — DISCHARGE SUMMARY
[facility]  General Surgery  Discharge Summary      Patient Name: Dominga Rosario  MRN: [id number]  Admission Date: 6/4/2018  Hospital Length of Stay: 0 days  Discharge Date and Time: 6/4/2018  Attending Physician: No att. providers found   Discharging Provider: Alejandro Cisse MD  Primary Care Provider: Shagufta Reynolds MD     HPI: admitted for ops    Procedure(s) (LRB):  ORIF, ANKLE (Right)     Hospital Course: dc home once criteria met    Consults:   Consults         Status Ordering Provider     Inpatient consult to Social Work  Once     Provider:  (Not yet assigned)    ALEJANDRO Callaway            Pending Diagnostic Studies:     None        Final Active Diagnoses:    Diagnosis Date Noted POA    PRINCIPAL PROBLEM:  Fracture of ankle, bimalleolar, right, closed, initial encounter [S82.841A] 06/04/2018 Yes      Problems Resolved During this Admission:    Diagnosis Date Noted Date Resolved POA      Discharged Condition: good    Disposition: Home or Self Care    Follow Up:  Follow-up Information     Alejandro Cisse MD In 10 days.    Specialty:  Orthopedic Surgery  Why:  For suture removal, For wound re-check  Contact information:  [address]  [facility] [address]  [phone]             [facility].    Specialty:  DME Provider  Why:  DME  Contact information:  [address]  [facility]  [facility] [id number] [phone]             [facility] In 1 day.    Specialty:  Home Health Services  Why:  Home Health  Contact information:  [address]  [place] [address]  [phone]             Shagufta Reynolds MD In 1 week.    Specialty:  Internal Medicine  Why:  See PCP in 1 week  Contact information:  [address]  [address]  [facility] [id number] [phone]                 Patient Instructions:     WHEELCHAIR FOR HOME USE   Order Specific Question Answer Comments   Hours in W/C per day: 12    Type of Wheelchair: Standard   "  Size(Width): 18"(STD adult)    Leg Support: Elevating leg rests    Lap Belt: Buckle    Cushion: Basic    Height: 5' 7" (1.702 m)    Weight: 92.1 kg (203 lb)    Length of need (1-99 months): 3    Please check all that apply: Caregiver is capable and willing to operate wheelchair safely.    Please check all that apply: The patient has significant edema of the lower extremities that requires an elevating leg rest.    Please check all that apply: The patient requires the use of a w/c for activities of daily living within the Home.    Please check all that apply: Patient mobility limitations cannot be sufficiently resolved by the use of other ambulatory therapies.      Referral to Home health   Referral Priority: Routine Referral Type: Home Health Care   Referral Reason: Specialty Services Required    Requested Specialty: Home Health Services    Number of Visits Requested: 1      Diet Adult Regular     Diet general     Ice to affected area     Keep surgical extremity elevated     Weight bearing restrictions (specify)   Order Comments: NWB RLE     Call MD for:  temperature >100.4     Call MD for:  persistent nausea and vomiting     Call MD for:  severe uncontrolled pain     Call MD for:  difficulty breathing, headache or visual disturbances     Call MD for:  redness, tenderness, or signs of infection (pain, swelling, redness, odor or green/yellow discharge around incision site)     Call MD for:  hives     Call MD for:  persistent dizziness or light-headedness     Call MD for:  extreme fatigue     Leave dressing on - Keep it clean, dry, and intact until clinic visit       Medications:  Reconciled Home Medications:      Medication List      START taking these medications    acetaminophen 650 MG Tbsr  Commonly known as:  TYLENOL  Take 1 tablet (650 mg total) by mouth every 8 (eight) hours.     cephALEXin 500 MG capsule  Commonly known as:  KEFLEX  Take 1 capsule (500 mg total) by mouth every 6 (six) hours.     oxyCODONE 5 " mg Cap  Commonly known as:  OXY-IR  I to ii po q4h prn pain        CONTINUE taking these medications    aspirin 81 MG EC tablet  Commonly known as:  ECOTRIN  Take 81 mg by mouth 2 (two) times daily.     cloNIDine 0.1 MG tablet  Commonly known as:  CATAPRES  Take 0.1 mg by mouth 2 (two) times daily.     losartan 100 MG tablet  Commonly known as:  COZAAR  Take 100 mg by mouth once daily.     methocarbamol 500 MG Tab  Commonly known as:  ROBAXIN  Take 500 mg by mouth 4 (four) times daily.     RisperDAL 1 MG tablet  Generic drug:  risperiDONE  Take 1 mg by mouth once daily.     traZODone 100 MG tablet  Commonly known as:  DESYREL  Take 100 mg by mouth every evening.        STOP taking these medications    oxyCODONE-acetaminophen 5-325 mg per tablet  Commonly known as:  PERCOCET            Pablo Cisse MD  General Surgery  [facility]

## (undated) DEVICE — GAUZE FLUFF XXLG 36X36 2 PLY

## (undated) DEVICE — GAUZE SPONGE 4X4 12PLY

## (undated) DEVICE — BUCKET PLASTER DISPOSABLE

## (undated) DEVICE — GUIDEWIRE THRD ASNIS 1.4X150
Type: IMPLANTABLE DEVICE | Site: ANKLE | Status: NON-FUNCTIONAL
Removed: 2018-06-04

## (undated) DEVICE — ELECTRODE REM PLYHSV RETURN 9

## (undated) DEVICE — BIT DRILL TWST CANN ASNIS 2.7M

## (undated) DEVICE — SOL 9P NACL IRR PIC IL

## (undated) DEVICE — DRESSING ADAPTIC TOUCH 3X4

## (undated) DEVICE — GLOVE BIOGEL SKINSENSE PI 6.5

## (undated) DEVICE — GLOVE BIOGEL SKINSENSE PI 7.0

## (undated) DEVICE — BIT DRILL AO 2.6X135MM SCALED

## (undated) DEVICE — BIT DRILL AO 2X135MM SCALED

## (undated) DEVICE — GLOVE BIOGEL SKINSENSE PI 8.0

## (undated) DEVICE — APPLICATOR CHLORAPREP ORN 26ML

## (undated) DEVICE — Device

## (undated) DEVICE — DRAPE MOBILE C-ARM

## (undated) DEVICE — PADDING CAST SPECIALIST 6X4YD

## (undated) DEVICE — SEE MEDLINE ITEM 146271

## (undated) DEVICE — TAPE ADHESIVE 2IN3M

## (undated) DEVICE — SUT VICRYL 3-0 27 SH

## (undated) DEVICE — BIT DRILL OVERDRILL AO 2.7MM